# Patient Record
Sex: MALE | Race: WHITE
[De-identification: names, ages, dates, MRNs, and addresses within clinical notes are randomized per-mention and may not be internally consistent; named-entity substitution may affect disease eponyms.]

---

## 2020-12-13 ENCOUNTER — HOSPITAL ENCOUNTER (EMERGENCY)
Dept: HOSPITAL 8 - ED | Age: 35
Discharge: HOME | End: 2020-12-13
Payer: MEDICAID

## 2020-12-13 VITALS — WEIGHT: 155.87 LBS | HEIGHT: 68 IN | BODY MASS INDEX: 23.62 KG/M2

## 2020-12-13 VITALS — DIASTOLIC BLOOD PRESSURE: 63 MMHG | SYSTOLIC BLOOD PRESSURE: 136 MMHG

## 2020-12-13 DIAGNOSIS — S60.031A: Primary | ICD-10-CM
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
## 2020-12-13 PROCEDURE — 29130 APPL FINGER SPLINT STATIC: CPT

## 2020-12-13 PROCEDURE — 99283 EMERGENCY DEPT VISIT LOW MDM: CPT

## 2020-12-13 NOTE — NUR
PT SITTING ON GURNEY AWAKE & COMFORTABLE WATCHING TV, RESPONDS APPROP TO STAFF, 
NAD, NO NEEDS AT THIS TIME, CALL LIGHT WITHIN REACH, XR DONE AT BS.